# Patient Record
Sex: FEMALE | Race: WHITE | NOT HISPANIC OR LATINO
[De-identification: names, ages, dates, MRNs, and addresses within clinical notes are randomized per-mention and may not be internally consistent; named-entity substitution may affect disease eponyms.]

---

## 2023-11-08 ENCOUNTER — DASHBOARD ENCOUNTERS (OUTPATIENT)
Age: 53
End: 2023-11-08

## 2023-11-08 ENCOUNTER — LAB OUTSIDE AN ENCOUNTER (OUTPATIENT)
Dept: URBAN - METROPOLITAN AREA CLINIC 105 | Facility: CLINIC | Age: 53
End: 2023-11-08

## 2023-11-08 ENCOUNTER — OFFICE VISIT (OUTPATIENT)
Dept: URBAN - METROPOLITAN AREA CLINIC 105 | Facility: CLINIC | Age: 53
End: 2023-11-08
Payer: COMMERCIAL

## 2023-11-08 VITALS
BODY MASS INDEX: 30.99 KG/M2 | SYSTOLIC BLOOD PRESSURE: 120 MMHG | HEART RATE: 78 BPM | DIASTOLIC BLOOD PRESSURE: 77 MMHG | WEIGHT: 186 LBS | HEIGHT: 65 IN | TEMPERATURE: 97.3 F

## 2023-11-08 DIAGNOSIS — Z12.11 COLON CANCER SCREENING: ICD-10-CM

## 2023-11-08 DIAGNOSIS — K21.9 GASTROESOPHAGEAL REFLUX DISEASE, UNSPECIFIED WHETHER ESOPHAGITIS PRESENT: ICD-10-CM

## 2023-11-08 PROBLEM — 235595009: Status: ACTIVE | Noted: 2023-11-08

## 2023-11-08 PROCEDURE — 99204 OFFICE O/P NEW MOD 45 MIN: CPT | Performed by: INTERNAL MEDICINE

## 2023-11-08 RX ORDER — MONTELUKAST SODIUM 4 MG/1
1 TABLET TABLET, CHEWABLE ORAL ONCE A DAY
Status: ACTIVE | COMMUNITY

## 2023-11-08 RX ORDER — NORTRIPTYLINE HYDROCHLORIDE 50 MG/1
1 CAPSULE CAPSULE ORAL ONCE A DAY
Qty: 30 CAPSULE | Refills: 0 | Status: ACTIVE | COMMUNITY

## 2023-11-08 RX ORDER — LORATADINE 10 MG
1 TABLET TABLET ORAL ONCE A DAY
Status: ACTIVE | COMMUNITY

## 2023-11-08 RX ORDER — CIPROFLOXACIN 500 MG/1
TAKE 1 TABLET BY MOUTH EVERY 12 HOURS FOR 7 DAYS TABLET, FILM COATED ORAL
Qty: 14 EACH | Refills: 0 | Status: ON HOLD | COMMUNITY

## 2023-11-08 RX ORDER — OMEPRAZOLE 40 MG/1
CAPSULE, DELAYED RELEASE ORAL
Qty: 90 CAPSULE | Status: ON HOLD | COMMUNITY

## 2023-11-08 RX ORDER — MORPHINE SULFATE 15 MG/1
TAKE 1 TABLET BY MOUTH EVERY 8 HOURS FOR 30 DAYS TABLET, FILM COATED, EXTENDED RELEASE ORAL
Qty: 90 EACH | Refills: 0 | Status: ACTIVE | COMMUNITY

## 2023-11-08 RX ORDER — TIZANIDINE 4 MG/1
TAKE 1 TABLET BY MOUTH TWICE DAILY AS NEEDED TABLET ORAL
Qty: 60 EACH | Refills: 0 | Status: ACTIVE | COMMUNITY

## 2023-11-08 RX ORDER — DULOXETINE 60 MG/1
TAKE 1 CAPSULE BY MOUTH EVERY DAY CAPSULE, DELAYED RELEASE ORAL
Qty: 30 EACH | Refills: 2 | Status: ACTIVE | COMMUNITY

## 2023-11-08 RX ORDER — ACYCLOVIR 200 MG/1
CAPSULE ORAL
Qty: 90 CAPSULE | Status: ACTIVE | COMMUNITY

## 2023-11-08 NOTE — HPI-TODAY'S VISIT:
The patient presents for a screening colonoscopy.  Today, she says she has never had a colon. She has an issue with constipation due to taking Morphine. She has a daily BM, but can go 3 days w/o a BM. Omeprazole controls reflux for the most part. She has had reflux for more than 5 years. She says she is on medicare.  PSH: upper and lower spinal stimulators installed

## 2023-12-21 ENCOUNTER — OFFICE VISIT (OUTPATIENT)
Dept: URBAN - METROPOLITAN AREA SURGERY CENTER 16 | Facility: SURGERY CENTER | Age: 53
End: 2023-12-21

## 2024-01-11 ENCOUNTER — OFFICE VISIT (OUTPATIENT)
Dept: URBAN - METROPOLITAN AREA SURGERY CENTER 16 | Facility: SURGERY CENTER | Age: 54
End: 2024-01-11